# Patient Record
Sex: MALE | Race: BLACK OR AFRICAN AMERICAN | NOT HISPANIC OR LATINO | Employment: FULL TIME | URBAN - METROPOLITAN AREA
[De-identification: names, ages, dates, MRNs, and addresses within clinical notes are randomized per-mention and may not be internally consistent; named-entity substitution may affect disease eponyms.]

---

## 2024-02-13 ENCOUNTER — HOSPITAL ENCOUNTER (EMERGENCY)
Facility: CLINIC | Age: 34
Discharge: HOME | End: 2024-02-13
Attending: EMERGENCY MEDICINE | Admitting: EMERGENCY MEDICINE
Payer: MEDICAID

## 2024-02-13 VITALS
BODY MASS INDEX: 32.01 KG/M2 | OXYGEN SATURATION: 100 % | SYSTOLIC BLOOD PRESSURE: 142 MMHG | HEIGHT: 69 IN | HEART RATE: 91 BPM | WEIGHT: 216.1 LBS | RESPIRATION RATE: 16 BRPM | DIASTOLIC BLOOD PRESSURE: 90 MMHG

## 2024-02-13 DIAGNOSIS — F32.A DEPRESSION, UNSPECIFIED DEPRESSION TYPE: ICD-10-CM

## 2024-02-13 PROBLEM — F43.21 ADJUSTMENT DISORDER WITH DEPRESSED MOOD: Status: ACTIVE | Noted: 2024-02-13

## 2024-02-13 PROCEDURE — 99283 EMERGENCY DEPT VISIT LOW MDM: CPT | Performed by: EMERGENCY MEDICINE

## 2024-02-13 ASSESSMENT — ACTIVITIES OF DAILY LIVING (ADL)
ADLS_ACUITY_SCORE: 35
ADLS_ACUITY_SCORE: 33

## 2024-02-13 NOTE — ED TRIAGE NOTES
Triage Assessment (Adult)       Row Name 02/13/24 0915          Triage Assessment    Airway WDL WDL        Respiratory WDL    Respiratory WDL WDL        Skin Circulation/Temperature WDL    Skin Circulation/Temperature WDL WDL        Cardiac WDL    Cardiac WDL WDL        Peripheral/Neurovascular WDL    Peripheral Neurovascular WDL WDL        Cognitive/Neuro/Behavioral WDL    Cognitive/Neuro/Behavioral WDL WDL

## 2024-02-13 NOTE — ED PROVIDER NOTES
"ED Provider Note  [facility]      History     Chief Complaint   Patient presents with    Mental Health Problem     Pt states he feels severely depressed for 2 months but denies SI/SIB.  Pt admits to having thoughts of harming others but reluctant to discuss.       HPI  Lion Day is a 33 year old male who presents feeling depressed.  He states this has been ongoing for approximately 2 months.  No known precipitating factors.  He has not had issues with depression or depressive episodes in the past.  He denies suicidal ideation.  He states he occasionally thinks of harming others, no one specifically.  He has no plans to harm anyone.  No drug or alcohol use.  He was recently hospitalized for intra-abdominal abscess, is currently off antibiotics.  No other recent illness.  No other symptoms noted.    Past Medical History  History reviewed. No pertinent past medical history.  History reviewed. No pertinent surgical history.  No current outpatient medications on file.    No Known Allergies  Family History  History reviewed. No pertinent family history.  Social History   Social History     Tobacco Use    Smoking status: Every Day     Packs/day: .5     Types: Cigarettes    Smokeless tobacco: Never   Substance Use Topics    Alcohol use: Not Currently    Drug use: Yes     Types: Marijuana      Past medical history, past surgical history, medications, allergies, family history, and social history were reviewed with the patient. No additional pertinent items.      A medically appropriate review of systems was performed with pertinent positives and negatives noted in the HPI, and all other systems negative.    Physical Exam   BP: (!) 142/90  Pulse: 91  Resp: 16  Height: 175.3 cm (5' 9\")  Weight: 98 kg (216 lb 1.6 oz)  SpO2: 100 %  Physical Exam  Vitals and nursing note reviewed.   Constitutional:       General: He is not in acute distress.     Appearance: Normal appearance. He is not " toxic-appearing.   HENT:      Head: Atraumatic.   Eyes:      General: No scleral icterus.     Conjunctiva/sclera: Conjunctivae normal.   Cardiovascular:      Rate and Rhythm: Normal rate.      Heart sounds: Normal heart sounds.   Pulmonary:      Effort: Pulmonary effort is normal. No respiratory distress.      Breath sounds: Normal breath sounds.   Abdominal:      Palpations: Abdomen is soft.      Tenderness: There is no abdominal tenderness.   Musculoskeletal:         General: No deformity.      Cervical back: Neck supple.   Skin:     General: Skin is warm.   Neurological:      Mental Status: He is alert.   Psychiatric:         Mood and Affect: Mood is depressed. Affect is tearful.         Behavior: Behavior is withdrawn.         Thought Content: Thought content does not include homicidal or suicidal ideation.           ED Course, Procedures, & Data      Procedures                      No results found for any visits on 02/13/24.  Medications - No data to display  Labs Ordered and Resulted from Time of ED Arrival to Time of ED Departure - No data to display  No orders to display              Assessment & Plan    Is a 33-year-old male who presents with depression.  This has been ongoing for 2 months with no known precipitating factors.  He denies any suicidal ideation.  He states he occasionally thinks of harming others but has no plans to do so and no specific people he wants to harm.  On exam patient is tearful and appears to have depressed affect.  Patient was seen by  who recommends discharge with therapy follow-up.  This was established for patient.  Patient does not want to currently try any medications.  He is not suicidal and does not have any plans to harm himself or anyone else.  There is possibility that patient recent long antibiotic regimens for intra-abdominal abscess may have contributed to this depressive episode.  Discussed this with patient.  He has been off antibiotics since the 27th of  last month.  We will discharge with return precautions.    I have reviewed the nursing notes. I have reviewed the findings, diagnosis, plan and need for follow up with the patient.    New Prescriptions    No medications on file       Final diagnoses:   None       Remy Barrett DO  [facility] EMERGENCY DEPARTMENT  2/13/2024     Remy Barrett DO  02/13/24 0673

## 2024-02-13 NOTE — DISCHARGE INSTRUCTIONS
Therapy Appointment (Virtual)  Date: Thursday, 2/15/2024  Time: 5:00 pm - 5:50 pm  Provider: Martha Acevedo MA  [facility]  Location: [facility], [address] [place] [address], [place] [id number]  Phone: [phone]  Type: Teletherapy  Patient Instructions  website: [email or web address] * We have an online client portal that will be set up for all new clients. Please watch for an email for appt instructions and intake paperwork. We do not have a , when you arrive at the office, please just find a seat and your therapist will come to you when they are ready. If doors are closed, we are in session, but we will be with you as soon as we are able. Thank you.

## 2024-02-13 NOTE — CONSULTS
Diagnostic Evaluation Consultation  Crisis Assessment    Patient Name: Lion Day  Age:  33 year old  Legal Sex: male  Gender Identity: male  Pronouns:   Race: Black or   Ethnicity: Not  or   Language: English      Patient was assessed: Virtual: iPad Crisis Assessment Start Time: 1140 Crisis Assessment Stop Time: 1254  Patient location: [facility] EMERGENCY DEPARTMENT                                 Referral Data and Chief Complaint  Lion Day presents to the ED by  self. Patient is presenting to the ED for the following concerns: Worsening psychosocial stress, Depression.   Factors that make the mental health crisis life threatening or complex are:  Patient presents to the emergency room due to increasing depression, mood has been low, with symptoms worsening in the last 2 months.  Patient reports being really depressed, mood has been 'getting really bad'.  Patient reports he has not been sleeping well, often experiences nightmares, wakes up in the early morning hours unable to return to sleep.  Patient has been undergoing treatment for a significant abdominal infection.  Reports recently getting out of the hospital, being treated with strong antibiotics initially for 14 days, then a second antibiotic for 21 days.  Reports increased irritability, low frustration tolerance, at times feeling he would be better off dead however denies plan or intent to act on suicide.  Patient reports feeling a desire to harm other people however this is in context of low mood, increased frustration.  Patient does not have plan or intent to harm anyone.  Does report prior history of aggressive behavior however has not been acting aggressively for some time, has been working to use other skills to avoid physical confrontation.  Patient reports working as a , feels better when not needing to interact with others.  Reports frustration over his inability to have  consistent face-to-face contact with his son.  Patient has an 11-year-old son who resides primarily with her mother, mother moved son to [place], [place] and has not been abiding by divorce decree.  Pt reports this loss has been most challenging for him recently.  Reports having low motivation, frequent episodes of his motions becoming quite dark (not violent), hopeless.  Reports feeling like he is ' running in place', not seeing much in regard to positive reward for his work.  Patient acknowledges smoking marijuana regularly, has limited supports.  Patient does live with his significant other, is seeking help today at the request of his SO, does not want his current mood to cause his relationship to fall apart.  Patient is open to therapy, is not interested in medication at this time.  Patient reports aversion to medication after undergoing significant antibiotic treatment for recent infection.  Patient is encouraged to connect with GI MD to discuss symptoms as they may relate to recent antibiotics, overall gut health..      Informed Consent and Assessment Methods  Explained the crisis assessment process, including applicable information disclosures and limits to confidentiality, assessed understanding of the process, and obtained consent to proceed with the assessment.  Assessment methods included conducting a formal interview with patient, review of medical records, collaboration with medical staff, and obtaining relevant collateral information from family and community providers when available.  : done     Patient response to interventions: acceptance expressed, verbalizes understanding  Coping skills were attempted to reduce the crisis:  Working, spending time with significant other, walking dog     History of the Crisis   Patient reports no significant history of depression prior to last 2 weeks.  Reports struggling with some low mood however never to the degree it currently is.  No history of hospitalizations, does  acknowledge engaging in therapy in the past when it was court ordered.  Did find this to be helpful.  Patient has not been under commitment.  Denies substance use issues.  Patient has been receiving intensive treatment for intestinal abscess, has undergone multiple rounds of antibiotics.  Patient feels antibiotics have a significant impact on current mood, states he has completed last round of antibiotics.    Brief Psychosocial History  Family:  Lives with Significant Other, Children yes (11-year-old son)  Support System:  Significant Other  Employment Status:  employed full-time  Source of Income:  salary/wages  Financial Environmental Concerns:  none  Current Hobbies:  television/movies/videos, sports/team sports, travel, group/social activities, interaction with pets, outdoor activities  Barriers in Personal Life:  other (see comments) (Recent health issues)    Significant Clinical History  Current Anxiety Symptoms:     Current Depression/Trauma:  crying or feels like crying, low self esteem, irritable, hopelessness (Irritability, low frustration tolerance,)  Current Somatic Symptoms:     Current Psychosis/Thought Disturbance:     Current Eating Symptoms:     Chemical Use History:  Marijuana: Daily  Last Use:: 02/13/24   Past diagnosis:  No known past diagnosis  Family history:  No known history of mental health or chemical health concerns  Past treatment:  Individual therapy, Probation/Court ordered treatment  Details of most recent treatment:  Patient has not been in therapy for some time, does not have any current outpatient mental health providers.  Other relevant history:  No prior significant history of depression or anxiety       Collateral Information  Is there collateral information: No     Collateral information name, relationship, phone number:       What happened today:       What is different about patient's functioning:       Concern about alcohol/drug use:      What do you think the patient needs:       Has patient made comments about wanting to kill themselves/others:      If d/c is recommended, can they take part in safety/aftercare planning:       Additional collateral information:        Risk Assessment  Hardin Suicide Severity Rating Scale Full Clinical Version:  Suicidal Ideation  Q1 Wish to be Dead (Lifetime): No  Q2 Non-Specific Active Suicidal Thoughts (Lifetime): No     Suicidal Behavior (Lifetime)  Actual Attempt (Lifetime): No  Has subject engaged in non-suicidal self-injurious behavior? (Lifetime): No  Interrupted Attempts (Lifetime): No  Aborted or Self-Interrupted Attempt (Lifetime): No  Preparatory Acts or Behavior (Lifetime): No    Hardin Suicide Severity Rating Scale Recent:   Suicidal Ideation (Recent)  Q1 Wished to be Dead (Past Month): yes  Q2 Suicidal Thoughts (Past Month): no     Suicidal Behavior (Recent)  Actual Attempt (Past 3 Months): No  Has subject engaged in non-suicidal self-injurious behavior? (Past 3 Months): No  Interrupted Attempts (Past 3 Months): No  Aborted or Self-Interrupted Attempt (Past 3 Months): No  Preparatory Acts or Behavior (Past 3 Months): No    Environmental or Psychosocial Events: new diagnosis of major illness, recent life events (see comment) (Recently completed antibiotic treatment for intestinal abscess)  Protective Factors: Protective Factors: strong bond to family unit, community support, or employment, intact marriage or domestic partnership, responsibilities and duties to others, including pets and children, sense of importance of health and wellness, able to access care without barriers, help seeking, constructive use of leisure time, enjoyable activities, resilience    Does the patient have thoughts of harming others? Feels Like Hurting Others: no  Previous Attempt to Hurt Others: no  Is the patient engaging in sexually inappropriate behavior?: no    Is the patient engaging in sexually inappropriate behavior?  no        Mental Status Exam    Affect: Appropriate  Appearance: Appropriate  Attention Span/Concentration: Attentive  Eye Contact: Engaged    Fund of Knowledge: Appropriate   Language /Speech Content: Fluent  Language /Speech Volume: Normal  Language /Speech Rate/Productions: Normal  Recent Memory: Intact  Remote Memory: Intact  Mood: Irritable, Depressed, Sad  Orientation to Person: Yes   Orientation to Place: Yes  Orientation to Time of Day: Yes  Orientation to Date: Yes     Situation (Do they understand why they are here?): Yes  Psychomotor Behavior: Normal  Thought Content: Clear  Thought Form: Intact     Mini-Cog Assessment  Number of Words Recalled:    Clock-Drawing Test:     Three Item Recall:    Mini-Cog Total Score:       Medication  Psychotropic medications:   Medication Orders - Psychiatric (From admission, onward)      None             Current Care Team  Patient Care Team:  No Ref-Primary, Physician as PCP - General    Diagnosis  Patient Active Problem List   Diagnosis Code    Adjustment disorder with depressed mood F43.21       Primary Problem This Admission  Active Hospital Problems    Adjustment disorder with depressed mood        Clinical Summary and Substantiation of Recommendations   Patient presenting to the emergency room due to worsening symptoms of depression over the past 2 months.  Patient's symptoms are occurring in context of significant antibiotic treatment for abdominal abscess.  Patient denies SI, does report increased irritability, low frustration tolerance, disrupted sleep, hopelessness at times.  Patient lives in a safe environment, is open to individual therapy.  Patient does not want to consider medication management at this time, is encouraged to follow-up with GI MD to discuss possible ongoing side effects from antibiotics.  Patient is not presenting as a significant risk to self or others, outpatient supports including individual therapy appointments have been scheduled.  Recommendation is for patient to  discharge with outpatient therapy.      Patient coping skills attempted to reduce the crisis:  Working, spending time with significant other, walking dog    Disposition  Recommended disposition: Individual Therapy, Other. please comment (Patient to follow-up with GI doctor)        Reviewed case and recommendations with attending provider. Attending Name: Dr Barrett       Attending concurs with disposition: yes       Patient and/or validated legal guardian concurs with disposition:   yes       Final disposition:  discharge    Legal status on admission:      Assessment Details   Total duration spent with the patient: 74 min     CPT code(s) utilized: 90142 - Psychotherapy for Crisis - 60 (30-74*) min    Norma Velazquez [facility], Psychotherapist  [facility] - Triage & Transition Services  Callback: [phone]